# Patient Record
Sex: MALE | HISPANIC OR LATINO
[De-identification: names, ages, dates, MRNs, and addresses within clinical notes are randomized per-mention and may not be internally consistent; named-entity substitution may affect disease eponyms.]

---

## 2024-09-24 ENCOUNTER — NON-APPOINTMENT (OUTPATIENT)
Age: 29
End: 2024-09-24

## 2024-09-24 PROBLEM — Z00.00 ENCOUNTER FOR PREVENTIVE HEALTH EXAMINATION: Status: ACTIVE | Noted: 2024-09-24

## 2024-09-25 ENCOUNTER — NON-APPOINTMENT (OUTPATIENT)
Age: 29
End: 2024-09-25

## 2024-09-25 ENCOUNTER — TRANSCRIPTION ENCOUNTER (OUTPATIENT)
Age: 29
End: 2024-09-25

## 2024-09-25 ENCOUNTER — APPOINTMENT (OUTPATIENT)
Dept: ENDOCRINOLOGY | Facility: CLINIC | Age: 29
End: 2024-09-25
Payer: COMMERCIAL

## 2024-09-25 VITALS
DIASTOLIC BLOOD PRESSURE: 79 MMHG | HEART RATE: 75 BPM | HEIGHT: 60 IN | SYSTOLIC BLOOD PRESSURE: 121 MMHG | BODY MASS INDEX: 27.09 KG/M2 | WEIGHT: 138 LBS

## 2024-09-25 DIAGNOSIS — R79.89 OTHER SPECIFIED ABNORMAL FINDINGS OF BLOOD CHEMISTRY: ICD-10-CM

## 2024-09-25 PROCEDURE — 99203 OFFICE O/P NEW LOW 30 MIN: CPT | Mod: 25

## 2024-09-25 RX ORDER — LORATADINE 10 MG/1
CAPSULE, LIQUID FILLED ORAL
Refills: 0 | Status: ACTIVE | COMMUNITY

## 2024-09-25 NOTE — PHYSICAL EXAM
[No Acute Distress] : no acute distress [Normal Sclera/Conjunctiva] : normal sclera/conjunctiva [No Proptosis] : no proptosis [Normal Outer Ear/Nose] : the ears and nose were normal in appearance [Normal Oropharynx] : the oropharynx was normal [Thyroid Not Enlarged] : the thyroid was not enlarged [No Thyroid Nodules] : no palpable thyroid nodules [Clear to Auscultation] : lungs were clear to auscultation bilaterally [Normal Rate] : heart rate was normal [No Edema] : no peripheral edema [Soft] : abdomen soft [Spine Straight] : spine straight [No Stigmata of Cushings Syndrome] : no stigmata of Cushings Syndrome [Normal Gait] : normal gait [Normal Strength/Tone] : muscle strength and tone were normal [No Rash] : no rash [Normal Reflexes] : deep tendon reflexes were 2+ and symmetric [No Tremors] : no tremors [Oriented x3] : oriented to person, place, and time [Kyphosis] : no kyphosis present [Acanthosis Nigricans] : no acanthosis nigricans

## 2024-09-25 NOTE — ADDENDUM
[FreeTextEntry1] : I, Mitchell You act solely as a scribe for Dr. Bridger Bryan on this date 09/25/2024

## 2024-09-25 NOTE — END OF VISIT
[FreeTextEntry3] :  All medical record entries made by the Scribe were at my, Dr. Bridger Bryan, direction and personally dictated by me on 09/25/2024. I have reviewed the chart and agree that the record accurately reflects my personal performance of the history, physical exam, assessment and plan. I have also personally directed, reviewed and agreed with the chart. [Time Spent: ___ minutes] : I have spent [unfilled] minutes of time on the encounter which excludes teaching and separately reported services.

## 2024-09-25 NOTE — HISTORY OF PRESENT ILLNESS
[FreeTextEntry1] : 29 year old F pt, with Hx of abnormal TFTs, referred by Dr. Chris Dudley DO, FAX [phone], presents today to establish endocrine care. 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  No pregnancies.  09/25/2024  CC: "I was referred by my PCP, Dr. Dudley after a routine visit revealed abnormal TFTs. I am feeling well otherwise." Pt endorses regular menses and denies history of irregular menses.  Pt was apparently dx with PCOS in  more than 5 years ago, based on an ovarian ultrasound, which was later unconfirmed.  Reviewed labs: - 09/19/24 LDL-c 139, s. creat 0.71, A1c 5.2%, TSH 1.23, vit D 24.3, T4 15.4 (4.5-12), B12 217,  [Medications verified as per pt on 09/25/2024] Current Medications: Loratadine 10 mg, magnesium, probiotics, vitamins, omega 3, vitamin D, oral contraceptive: Dixi 35,

## 2024-09-25 NOTE — HISTORY OF PRESENT ILLNESS
[FreeTextEntry1] : 29 year old F pt, with Hx of abnormal TFTs, referred by Dr. Chris Dudley DO, FAX [phone], presents today to establish endocrine care. 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  No pregnancies.  09/25/2024  CC: "I was referred by my PCP, Dr. Dudley after a routine visit revealed abnormal TFTs. I am feeling well otherwise." Pt endorses regular menses and denies history of irregular menses.  Pt was apparently dx with PCOS in  more than 5 years ago, based on an ovarian ultrasound, which was later unconfirmed.  Reviewed labs: - 09/19/24 LDL-c 139, s. creat 0.71, A1c 5.2%, TSH 1.23, vit D 24.3, T4 15.4 (4.5-12), B12 217,  [Medications verified as per pt on 09/25/2024] Current Medications: Loratadine 10 mg, magnesium, probiotics, vitamins, omega 3, vitamin D, oral contraceptive: Dixi 35,

## 2024-09-25 NOTE — REASON FOR VISIT
[Initial Evaluation] : an initial evaluation [FreeTextEntry2] : Dr. Chris Dudley, DO FAX [phone]

## 2024-09-25 NOTE — DATA REVIEWED
[FreeTextEntry1] : Scanned labs: - 09/19/24 LDL-c 139, s. creat 0.71, A1c 5.2%, TSH 1.23, vit D 24.3, T4 15.4 (4.5-12), B12 217,

## 2024-09-27 LAB
T4 FREE SERPL-MCNC: 1.4 NG/DL
TSH SERPL-ACNC: 1.07 UIU/ML
